# Patient Record
Sex: MALE | Race: WHITE | NOT HISPANIC OR LATINO | Employment: OTHER | URBAN - METROPOLITAN AREA
[De-identification: names, ages, dates, MRNs, and addresses within clinical notes are randomized per-mention and may not be internally consistent; named-entity substitution may affect disease eponyms.]

---

## 2022-10-25 ENCOUNTER — CONSULTATION/EVALUATION (OUTPATIENT)
Dept: URBAN - METROPOLITAN AREA CLINIC 32 | Facility: CLINIC | Age: 72
End: 2022-10-25

## 2022-10-25 DIAGNOSIS — H43.813: ICD-10-CM

## 2022-10-25 DIAGNOSIS — H35.373: ICD-10-CM

## 2022-10-25 DIAGNOSIS — H35.3131: ICD-10-CM

## 2022-10-25 DIAGNOSIS — Z98.890: ICD-10-CM

## 2022-10-25 DIAGNOSIS — H57.813: ICD-10-CM

## 2022-10-25 DIAGNOSIS — H02.834: ICD-10-CM

## 2022-10-25 DIAGNOSIS — H02.831: ICD-10-CM

## 2022-10-25 DIAGNOSIS — H25.13: ICD-10-CM

## 2022-10-25 PROCEDURE — 92134 CPTRZ OPH DX IMG PST SGM RTA: CPT

## 2022-10-25 PROCEDURE — 99204 OFFICE O/P NEW MOD 45 MIN: CPT

## 2022-10-25 PROCEDURE — 92015 DETERMINE REFRACTIVE STATE: CPT

## 2022-10-25 ASSESSMENT — VISUAL ACUITY
OD_SC: J5
OD_GLARE: 20/200
OS_GLARE: 20/200
OD_PH: 20/40-2
OS_PH: 20/50-1
OS_SC: J3
OD_SC: 20/200
OS_SC: 20/80-1

## 2022-10-25 ASSESSMENT — TONOMETRY
OD_IOP_MMHG: 15
OS_IOP_MMHG: 16

## 2022-10-25 ASSESSMENT — KERATOMETRY
OD_AXISANGLE_DEGREES: 25
OD_K2POWER_DIOPTERS: 39.25
OS_AXISANGLE_DEGREES: 150
OD_AXISANGLE2_DEGREES: 115
OS_K1POWER_DIOPTERS: 40.25
OS_K2POWER_DIOPTERS: 39.50
OD_K1POWER_DIOPTERS: 39.75
OS_AXISANGLE2_DEGREES: 60

## 2022-11-10 ENCOUNTER — DIAGNOSTICS ONLY (OUTPATIENT)
Dept: URBAN - METROPOLITAN AREA CLINIC 32 | Facility: CLINIC | Age: 72
End: 2022-11-10

## 2022-11-10 DIAGNOSIS — H25.13: ICD-10-CM

## 2022-11-10 DIAGNOSIS — H16.223: ICD-10-CM

## 2022-11-10 DIAGNOSIS — H35.3131: ICD-10-CM

## 2022-11-10 DIAGNOSIS — H35.373: ICD-10-CM

## 2022-11-10 PROCEDURE — 92025 CPTRIZED CORNEAL TOPOGRAPHY: CPT

## 2022-11-10 PROCEDURE — 92136TC INTERFEROMETRY - TECHNICAL COMPONENT

## 2022-11-10 PROCEDURE — 92286 ANT SGM IMG I&R SPECLR MIC: CPT

## 2022-11-10 PROCEDURE — 99213 OFFICE O/P EST LOW 20 MIN: CPT

## 2022-11-10 PROCEDURE — V2799PMN IMPRIMIS PRED-MOXI-NEPAF 5ML

## 2022-11-10 ASSESSMENT — KERATOMETRY
OS_AXISANGLE_DEGREES: 150
OS_K1POWER_DIOPTERS: 40.25
OS_K2POWER_DIOPTERS: 39.50
OD_AXISANGLE2_DEGREES: 115
OD_AXISANGLE_DEGREES: 25
OD_K2POWER_DIOPTERS: 39.25
OS_AXISANGLE2_DEGREES: 60
OD_K1POWER_DIOPTERS: 39.75

## 2022-11-10 ASSESSMENT — VISUAL ACUITY
OD_SC: 20/200
OD_SC: J5
OS_SC: J3
OD_GLARE: 20/200
OS_GLARE: 20/200
OS_SC: 20/80-1

## 2022-11-22 ENCOUNTER — SURGERY/PROCEDURE (OUTPATIENT)
Dept: URBAN - METROPOLITAN AREA CLINIC 32 | Facility: CLINIC | Age: 72
End: 2022-11-22

## 2022-11-22 DIAGNOSIS — H57.11: ICD-10-CM

## 2022-11-22 DIAGNOSIS — H25.11: ICD-10-CM

## 2022-11-22 PROCEDURE — 66984CV REMOVE CATARACT, INSERT LENS, CUSTOM VISION

## 2022-11-22 PROCEDURE — 68841 INSJ RX ELUT IMPLT LAC CANAL: CPT

## 2022-11-23 ENCOUNTER — POST-OP (OUTPATIENT)
Dept: URBAN - METROPOLITAN AREA CLINIC 32 | Facility: CLINIC | Age: 72
End: 2022-11-23

## 2022-11-23 DIAGNOSIS — Z96.1: ICD-10-CM

## 2022-11-23 PROCEDURE — 99024 POSTOP FOLLOW-UP VISIT: CPT

## 2022-11-23 ASSESSMENT — VISUAL ACUITY
OD_SC: 20/30
OS_SC: J5
OS_SC: 20/400
OD_SC: J5

## 2022-11-23 ASSESSMENT — TONOMETRY
OS_IOP_MMHG: 17
OD_IOP_MMHG: 16

## 2022-11-23 ASSESSMENT — KERATOMETRY
OD_K2POWER_DIOPTERS: 39.25
OS_AXISANGLE2_DEGREES: 60
OS_K1POWER_DIOPTERS: 40.25
OD_AXISANGLE2_DEGREES: 115
OS_K2POWER_DIOPTERS: 39.50
OD_K1POWER_DIOPTERS: 39.75
OS_AXISANGLE_DEGREES: 150
OD_AXISANGLE_DEGREES: 25

## 2022-11-29 ENCOUNTER — POST-OP (OUTPATIENT)
Dept: URBAN - METROPOLITAN AREA CLINIC 32 | Facility: CLINIC | Age: 72
End: 2022-11-29

## 2022-11-29 DIAGNOSIS — Z96.1: ICD-10-CM

## 2022-11-29 DIAGNOSIS — H25.12: ICD-10-CM

## 2022-11-29 PROCEDURE — 99213 OFFICE O/P EST LOW 20 MIN: CPT

## 2022-11-29 PROCEDURE — V2799PMN IMPRIMIS PRED-MOXI-NEPAF 5ML

## 2022-11-29 ASSESSMENT — KERATOMETRY
OD_AXISANGLE2_DEGREES: 115
OS_AXISANGLE_DEGREES: 150
OD_AXISANGLE_DEGREES: 25
OS_AXISANGLE2_DEGREES: 60
OS_K2POWER_DIOPTERS: 39.50
OD_K2POWER_DIOPTERS: 39.25
OD_AXISANGLE_DEGREES: 25
OS_AXISANGLE2_DEGREES: 60
OS_K1POWER_DIOPTERS: 40.25
OD_AXISANGLE2_DEGREES: 115
OS_K1POWER_DIOPTERS: 40.25
OD_K1POWER_DIOPTERS: 39.75
OD_K1POWER_DIOPTERS: 39.75
OS_K2POWER_DIOPTERS: 39.50
OS_AXISANGLE_DEGREES: 150
OD_K2POWER_DIOPTERS: 39.25

## 2022-11-29 ASSESSMENT — VISUAL ACUITY
OD_PH: 20/25-2
OD_SC: 20/50
OS_GLARE: 20/400
OS_SC: 20/200
OS_SC: J5

## 2022-11-29 ASSESSMENT — TONOMETRY
OD_IOP_MMHG: 20
OS_IOP_MMHG: 20

## 2022-11-30 NOTE — PATIENT DISCUSSION
Schedule Surgery: Visually significant to patient. The patient elects to proceed with cataract extraction.  Schedule OS eye first, then later in the OD eye if visual symptoms persist.

## 2022-12-06 ENCOUNTER — SURGERY/PROCEDURE (OUTPATIENT)
Dept: URBAN - METROPOLITAN AREA CLINIC 32 | Facility: CLINIC | Age: 72
End: 2022-12-06

## 2022-12-06 PROCEDURE — 68841 INSJ RX ELUT IMPLT LAC CANAL: CPT

## 2022-12-06 PROCEDURE — 66984CV REMOVE CATARACT, INSERT LENS, CUSTOM VISION

## 2022-12-07 ENCOUNTER — POST-OP (OUTPATIENT)
Dept: URBAN - METROPOLITAN AREA CLINIC 32 | Facility: CLINIC | Age: 72
End: 2022-12-07

## 2022-12-07 PROCEDURE — 99024 POSTOP FOLLOW-UP VISIT: CPT

## 2022-12-07 ASSESSMENT — KERATOMETRY
OD_AXISANGLE_DEGREES: 25
OD_K1POWER_DIOPTERS: 39.75
OS_AXISANGLE2_DEGREES: 60
OS_K2POWER_DIOPTERS: 39.50
OS_K1POWER_DIOPTERS: 40.25
OS_AXISANGLE_DEGREES: 150
OS_K1POWER_DIOPTERS: 40.25
OD_AXISANGLE_DEGREES: 25
OD_K2POWER_DIOPTERS: 39.25
OD_AXISANGLE2_DEGREES: 115
OS_AXISANGLE2_DEGREES: 60
OS_K2POWER_DIOPTERS: 39.50
OD_K1POWER_DIOPTERS: 39.75
OD_K2POWER_DIOPTERS: 39.25
OS_AXISANGLE_DEGREES: 150
OD_AXISANGLE2_DEGREES: 115

## 2022-12-07 ASSESSMENT — VISUAL ACUITY
OS_SC: 20/50-2
OD_SC: 20/40

## 2022-12-07 ASSESSMENT — TONOMETRY: OS_IOP_MMHG: 18

## 2022-12-13 ENCOUNTER — POST-OP (OUTPATIENT)
Dept: URBAN - METROPOLITAN AREA CLINIC 32 | Facility: CLINIC | Age: 72
End: 2022-12-13

## 2022-12-13 PROCEDURE — 92250 FUNDUS PHOTOGRAPHY W/I&R: CPT

## 2022-12-13 PROCEDURE — 99024 POSTOP FOLLOW-UP VISIT: CPT

## 2022-12-13 ASSESSMENT — KERATOMETRY
OS_AXISANGLE2_DEGREES: 60
OD_K1POWER_DIOPTERS: 39.75
OS_AXISANGLE_DEGREES: 150
OS_K1POWER_DIOPTERS: 40.25
OD_AXISANGLE2_DEGREES: 115
OS_K2POWER_DIOPTERS: 39.50
OD_AXISANGLE_DEGREES: 25
OD_K2POWER_DIOPTERS: 39.25

## 2022-12-13 ASSESSMENT — VISUAL ACUITY
OD_SC: J7
OS_SC: J2
OS_PH: 20/30
OD_SC: 20/40
OS_SC: 20/50-1
OD_PH: 20/30-2

## 2022-12-13 ASSESSMENT — TONOMETRY
OS_IOP_MMHG: 24
OD_IOP_MMHG: 16

## 2023-01-10 ENCOUNTER — POST-OP (OUTPATIENT)
Dept: URBAN - METROPOLITAN AREA CLINIC 32 | Facility: CLINIC | Age: 73
End: 2023-01-10

## 2023-01-10 DIAGNOSIS — Z96.1: ICD-10-CM

## 2023-01-10 PROCEDURE — 99024 POSTOP FOLLOW-UP VISIT: CPT

## 2023-01-10 ASSESSMENT — KERATOMETRY
OD_AXISANGLE_DEGREES: 25
OS_K1POWER_DIOPTERS: 40.25
OS_AXISANGLE2_DEGREES: 60
OD_AXISANGLE2_DEGREES: 115
OS_K2POWER_DIOPTERS: 39.50
OS_AXISANGLE_DEGREES: 150
OD_K1POWER_DIOPTERS: 39.75
OD_K2POWER_DIOPTERS: 39.25

## 2023-01-10 ASSESSMENT — TONOMETRY
OS_IOP_MMHG: 19
OD_IOP_MMHG: 13

## 2023-01-10 NOTE — PATIENT DISCUSSION
Schedule Surgery: Visually significant to patient. The patient elects to proceed with cataract extraction. Schedule OD.

## 2023-01-10 NOTE — PATIENT DISCUSSION
Post-op day one (1st eye): Patient's visual goal is distance. The patient was given follow-up appointment instructions and drop tapering schedule. Return in 1-2 weeks for stability check with AR.

## 2023-01-18 NOTE — PATIENT DISCUSSION
Post-op 1-2 week (1st eye): Patient's visual goal is distance. The patient was given follow-up appointment instructions and is to continue the drop tapering schedule as directed. Return in 1-2 weeks for stability check with AR.

## 2023-01-18 NOTE — PATIENT DISCUSSION
Reviewed notes with Dr. Govind Manley. Patient is cleared for surgery. Abdomen soft, non-tender, no guarding.

## 2023-01-24 NOTE — PATIENT DISCUSSION
Post-op day one (2nd eye): Patient's visual goal is distance . The patient was given follow-up appointment instructions and drop tapering schedule. Return in 1-2 weeks for stability check with AR.

## 2023-01-27 NOTE — PATIENT DISCUSSION
Post-op day three (2nd eye): Patient's visual goal is distance . The patient was given follow-up appointment instructions and drop tapering schedule. Return in 1-2 weeks for stability check with AR.

## 2023-07-17 ENCOUNTER — EMERGENCY VISIT (OUTPATIENT)
Dept: URBAN - METROPOLITAN AREA CLINIC 32 | Facility: CLINIC | Age: 73
End: 2023-07-17

## 2023-07-17 DIAGNOSIS — H44.23: ICD-10-CM

## 2023-07-17 DIAGNOSIS — H26.493: ICD-10-CM

## 2023-07-17 DIAGNOSIS — H35.373: ICD-10-CM

## 2023-07-17 DIAGNOSIS — H35.3131: ICD-10-CM

## 2023-07-17 PROCEDURE — 99214 OFFICE O/P EST MOD 30 MIN: CPT

## 2023-07-17 ASSESSMENT — VISUAL ACUITY
OS_PH: 20/30-1
OS_GLARE: 20/400
OD_SC: J10
OS_SC: 20/100-1
OD_SC: 20/25-2
OD_GLARE: 20/50
OS_SC: J5

## 2023-07-17 ASSESSMENT — KERATOMETRY
OD_K1POWER_DIOPTERS: 39.75
OD_AXISANGLE2_DEGREES: 115
OS_K2POWER_DIOPTERS: 39.50
OS_AXISANGLE_DEGREES: 150
OD_K2POWER_DIOPTERS: 39.25
OS_AXISANGLE2_DEGREES: 60
OD_AXISANGLE_DEGREES: 25
OS_K1POWER_DIOPTERS: 40.25

## 2023-07-17 ASSESSMENT — TONOMETRY
OD_IOP_MMHG: 15
OS_IOP_MMHG: 16

## 2023-08-28 ENCOUNTER — SURGERY/PROCEDURE (OUTPATIENT)
Dept: URBAN - METROPOLITAN AREA CLINIC 32 | Facility: CLINIC | Age: 73
End: 2023-08-28

## 2023-08-28 DIAGNOSIS — H26.491: ICD-10-CM

## 2023-08-28 PROCEDURE — 66821 AFTER CATARACT LASER SURGERY: CPT

## 2023-08-30 ASSESSMENT — KERATOMETRY
OS_AXISANGLE2_DEGREES: 60
OD_AXISANGLE2_DEGREES: 115
OS_K1POWER_DIOPTERS: 40.25
OD_AXISANGLE_DEGREES: 25
OD_K2POWER_DIOPTERS: 39.25
OS_AXISANGLE_DEGREES: 150
OS_K2POWER_DIOPTERS: 39.50
OD_K1POWER_DIOPTERS: 39.75

## 2023-09-11 ENCOUNTER — SURGERY/PROCEDURE (OUTPATIENT)
Dept: URBAN - METROPOLITAN AREA CLINIC 32 | Facility: CLINIC | Age: 73
End: 2023-09-11

## 2023-09-11 DIAGNOSIS — H26.492: ICD-10-CM

## 2023-09-11 PROCEDURE — 66821 AFTER CATARACT LASER SURGERY: CPT

## 2023-09-12 ASSESSMENT — KERATOMETRY
OD_AXISANGLE2_DEGREES: 115
OD_AXISANGLE_DEGREES: 25
OD_K1POWER_DIOPTERS: 39.75
OS_K1POWER_DIOPTERS: 40.25
OS_K2POWER_DIOPTERS: 39.50
OS_AXISANGLE_DEGREES: 150
OS_AXISANGLE2_DEGREES: 60
OD_K2POWER_DIOPTERS: 39.25

## 2023-09-25 ENCOUNTER — POST-OP (OUTPATIENT)
Dept: URBAN - METROPOLITAN AREA CLINIC 32 | Facility: CLINIC | Age: 73
End: 2023-09-25

## 2023-09-25 DIAGNOSIS — Z98.890: ICD-10-CM

## 2023-09-25 PROCEDURE — 99024 POSTOP FOLLOW-UP VISIT: CPT

## 2023-09-25 ASSESSMENT — KERATOMETRY
OD_AXISANGLE2_DEGREES: 115
OD_K2POWER_DIOPTERS: 39.25
OS_AXISANGLE_DEGREES: 150
OD_AXISANGLE_DEGREES: 25
OS_AXISANGLE2_DEGREES: 60
OD_K1POWER_DIOPTERS: 39.75
OS_K2POWER_DIOPTERS: 39.50
OS_K1POWER_DIOPTERS: 40.25

## 2023-09-25 ASSESSMENT — VISUAL ACUITY
OD_SC: 20/20
OS_SC: J3
OD_SC: J10
OS_SC: 20/50

## 2023-09-25 ASSESSMENT — TONOMETRY
OS_IOP_MMHG: 16
OD_IOP_MMHG: 14

## 2025-06-23 NOTE — PATIENT DISCUSSION
Bri A Young is a 74 year old female presenting for   Chief Complaint   Patient presents with    Follow-up     HTN FUV.      Denies Latex allergy or sensitivity.    Medication verified and med list updated  Refills needed today: No       Health Maintenance       Hepatitis B Vaccine (For Physician/APC Discussion) (2 of 3 - 19+ 3-dose series)  Overdue since 4/20/2001    COVID-19 Vaccine (4 - 2024-25 season)  Overdue since 9/1/2024    Microalbumin Ratio (Yearly)  Never done    Hepatitis C Screening (Once)  Never done    Respiratory Syncytial Virus (RSV) Vaccine 60+ (1 - Risk 60-74 years 1-dose series)  Never done    Traditional Medicare- Medicare Wellness Visit (Yearly)  Scheduled for 12/22/2025           Following review of the above:  Patient wishes to discuss with clinician: Microalbumin Ratio    Note: Refer to final orders and clinician documentation.            Unaddressed Risk Adjusted HCC Categories and Diagnoses          CMS V28 [id number] - Chronic Obstructive Pulmonary Disease  - Unaddressed Dx:Bronchiectasis, Uncomplicated (Cmd)  CMS V28 [id number] - Chronic Kidney Disease, Moderate (Stage 3b)  - Unaddressed Dx:Stage 3b Chronic Kidney Disease (Cmd)              Last lab results:   Hemoglobin A1C (%)   Date Value   05/19/2025 5.8 (H)     Cholesterol (mg/dL)   Date Value   05/29/2025 108     HDL (mg/dL)   Date Value   05/29/2025 61     Triglycerides (mg/dL)   Date Value   05/29/2025 78     LDL (mg/dL)   Date Value   05/29/2025 31     No results found for: \"URMIC\", \"UCR\", \"MALBCR\"  No results found for: \"IFOB\"                 Depression Screening:  Review Flowsheet  More data exists         5/22/2025   PHQ 2/9 Score   Adult PHQ 2 Score 0   Adult PHQ 2 Interpretation No further screening needed   Little interest or pleasure in activity? Not at all   Feeling down, depressed or hopeless? Not at all        Advance Directives:  not discussed     Smoking cessation discussed.